# Patient Record
Sex: MALE | Race: WHITE | URBAN - METROPOLITAN AREA
[De-identification: names, ages, dates, MRNs, and addresses within clinical notes are randomized per-mention and may not be internally consistent; named-entity substitution may affect disease eponyms.]

---

## 2017-04-19 ENCOUNTER — THERAPY VISIT (OUTPATIENT)
Dept: PHYSICAL THERAPY | Facility: CLINIC | Age: 57
End: 2017-04-19
Payer: COMMERCIAL

## 2017-04-19 DIAGNOSIS — M25.521 RIGHT ELBOW PAIN: ICD-10-CM

## 2017-04-19 DIAGNOSIS — S52.121D CLOSED DISPLACED FRACTURE OF HEAD OF RIGHT RADIUS WITH ROUTINE HEALING, SUBSEQUENT ENCOUNTER: Primary | ICD-10-CM

## 2017-04-19 PROCEDURE — 97110 THERAPEUTIC EXERCISES: CPT | Mod: GP | Performed by: PHYSICAL THERAPIST

## 2017-04-19 PROCEDURE — 97161 PT EVAL LOW COMPLEX 20 MIN: CPT | Mod: GP | Performed by: PHYSICAL THERAPIST

## 2017-04-19 NOTE — LETTER
[facility] ATHLETIC [facility] PHYSICAL THERAPY  [address]  [facility] [address]  [phone]    2017    Re: Dale Villareal   :   1960  MRN:  [id number]   REFERRING PHYSICIAN:   Chavo Pereyra    [facility] ATHLETIC [facility] PHYSICAL THERAPY    Date of Initial Evaluation:  2017  Visits:   1  Reason for Referral:     Closed displaced fracture of head of right radius with routine healing, subsequent encounter  Right elbow pain    [facility] Initial Evaluation - Upper Extremity   Evaluation Date:  17  Referral: Dr. Pereyra @ [facility]  Employment:  Retired   Employment Status: n/a  Work Mechanical Stresses:  N/a  Leisure Mechanical Stresses: driving, repetitive tasks  Baselines/Functional Disability: difficulty turning key to start car, unable to fully straighten elbow   VAS Score (0-10):  4    HISTORY:   Patient presents with:  Intermittent achy pain (Constant/Intermittent, Dull/Achy/Sharp/Stabbing/Throbbing)  Pain location:  (R) elbow   Radiates to:  none  Paraesthesia:  none  Present Since: 03/15/17  Commenced as a result of: fell and landed on cement, hitting elbow  Status:  New and improving slowly  (new/recurrent/chronic) and (improving/not improving/worsening)   Symptoms at onset: (R) elbow   Constant symptoms:  none  Intermittent symptoms: elbow  Symptoms are made worse with: bending elbow, reaching, pushing (to get up from a chair)  (bending, sitting, turning neck, dressing, reaching, gripping; am, as the day progresses, pm; when still, when on the move; sleeping: prone, supine, side (R), side (L); other)   Symptoms are made better with:  When still   (bending, sitting, turning neck, dressing, reaching, gripping; am, as the day progresses, pm; when still, when on the move; sleeping: prone, supine, side (R), side (L); other)     Re: Dale Villarael   :   1960    HISTORY  "(continued)  Continued use makes the pain:  Worse (better, worse, no effect)  Disturbed night:  no  Pain at rest:  no  Previous Episodes:  no  Previous Treatments:  none  Handedness:  Right     Specific Questions (as reported by the patient):  Do you have pain with coughing or sneezing:  no  Overall General Health:  good  Imaging/Special testing:  X-ray  Recent or major surgery:  no  Do you have night pain:  no  Have you had any recent accidents:  no  Have you experienced any unexplained weight loss:  no  Pertinent medical history includes: depression  Medical allergies includes: no  Current medications:  Anti-depressants   Barriers at home: none  Red Flags:  none    Site(s) for physical examination: (R) elbow/shoulder   OBJECTIVE EXAMINATION:   AROM:   Shoulder / Elbow   (Pain during motion: PDM; End-range Pain:  ERP)  MOVEMENT LOSS Right/Pain Left/Pain   Flexion  Wnl/ERP at proximal humerus    Abduction  Min loss/ERP proximal humerus    External Rotation     Internal Rotation     Extension      Flexion/IR Lacking 1\"/ERP    Extension/ER Lacking 1\"/ERP    ELBOW:      Flexion  142/ERP    Extension Lacking 16/tight    Supination/Pronation Wnl/ -       Re: Dale Villareal   :   1960    PROM:    (Pain during motion: PDM; End-range Pain:  ERP)  MOVEMENT LOSS Right/Pain Left/Pain   Flexion  Wnl/-    Abduction  Wnl/-    External Rotation Wnl/-    Internal Rotation Wnl/-    Extension      ELBOW:     Flexion  140/ERP    Extension Lacking 8/ERP    Resisted Testing:  (Pain during motion: PDM; End-range Pain:  ERP)   Right Left Pain   Shoulder:      Flexion  4+  +   Abduction  4+  +   External Rotation      Internal Rotation      Extension       Elbow:      Flexion  5-  +   Extension  4+  +    strength:  Right:  41lbs  Left:  46lbs  Repeated Tests:  Not assessed   Spine: Spine testing:   Not relevant  Provisional Classification:  Peripheral:   Other: Radial fx  Principle of Management:   Shoulder/Elbow " stretching and strengthening     Assessment/Plan:    Patient is a 56 year old male with right side elbow complaints.    Patient has the following significant findings with corresponding treatment plan.                Diagnosis 1:  Right Proximal Radial Fx  Pain -  manual therapy, self management, education, directional preference exercise and home program  Decreased ROM/flexibility - manual therapy, therapeutic exercise and home program  Decreased strength - therapeutic exercise, therapeutic activities and home program  Decreased function - therapeutic activities and home program          Re: Dale Villareal   :   1960    Therapy Evaluation Codes:   1) History comprised of:   Personal factors that impact the plan of care:      None.    Comorbidity factors that impact the plan of care are:      None.     Medications impacting care: None.  2) Examination of Body Systems comprised of:   Body structures and functions that impact the plan of care:      Elbow.   Activity limitations that impact the plan of care are:      Driving and pushing .  3) Clinical presentation characteristics are:   Stable/Uncomplicated.  4) Decision-Making    Low complexity using standardized patient assessment instrument and/or measureable assessment of functional outcome.  Cumulative Therapy Evaluation is: Low complexity.    Previous and current functional limitations:  (See Goal Flow Sheet for this information)    Short term and Long term goals: (See Goal Flow Sheet for this information)   Communication ability:  Patient appears to be able to clearly communicate and understand verbal and written communication and follow directions correctly.  Treatment Explanation - The following has been discussed with the patient:   RX ordered/plan of care, Anticipated outcomes, Possible risks and side effects    This patient would benefit from PT intervention to resume normal activities.   Rehab potential is good.  Frequency:  1 X week, once  daily  Duration:  for 4 weeks  Discharge Plan:  Achieve all LTG.  Independent in home treatment program.  Reach maximal therapeutic benefit.    Thank you for your referral.    INQUIRIES        Therapist:  Nicki Florez DPT, cert MDT   [facility] PHYSICAL THERAPY  [address] [facility] [address]  [facility] [address]  Phone: [phone]  Fax: [phone]

## 2017-04-19 NOTE — PROGRESS NOTES
[facility] Initial Evaluation - Upper Extremity     Evaluation Date:  04/19/17  Referral: Dr. Pereyra @ [facility]  Employment:  Retired   Employment Status: n/a  Work Mechanical Stresses:  N/a  Leisure Mechanical Stresses: driving, repetitive tasks  Baselines/Functional Disability: difficulty turning key to start car, unable to fully straighten elbow   VAS Score (0-10):  4      HISTORY:   Patient presents with:  Intermittent achy pain  (Constant/Intermittent, Dull/Achy/Sharp/Stabbing/Throbbing)    Pain location:  (R) elbow   Radiates to:  none  Paraesthesia:  none    Present Since: 03/15/17  Commenced as a result of: fell and landed on cement, hitting elbow  Status:  New and improving slowly  (new/recurrent/chronic) and (improving/not improving/worsening)   Symptoms at onset: (R) elbow   Constant symptoms:  none  Intermittent symptoms: elbow    Symptoms are made worse with: bending elbow, reaching, pushing (to get up from a chair)  (bending, sitting, turning neck, dressing, reaching, gripping; am, as the day progresses, pm; when still, when on the move; sleeping: prone, supine, side (R), side (L); other)   Symptoms are made better with:  When still   (bending, sitting, turning neck, dressing, reaching, gripping; am, as the day progresses, pm; when still, when on the move; sleeping: prone, supine, side (R), side (L); other)   Continued use makes the pain:  Worse   (better, worse, no effect)  Disturbed night:  no  Pain at rest:  no  Previous Episodes:  no  Previous Treatments:  none  Handedness:  Right     Specific Questions (as reported by the patient):  Do you have pain with coughing or sneezing:  no  Overall General Health:  good  Imaging/Special testing:  X-ray  Recent or major surgery:  no  Do you have night pain:  no  Have you had any recent accidents:  no  Have you experienced any unexplained weight loss:  no  Pertinent medical history includes: depression  Medical allergies includes:  "no  Current medications:  Anti-depressants   Barriers at home: none  Red Flags:  none      Site(s) for physical examination: (R) elbow/shoulder       OBJECTIVE EXAMINATION:         AROM:   Shoulder / Elbow   (Pain during motion: PDM; End-range Pain:  ERP)  MOVEMENT LOSS Right/Pain Left/Pain   Flexion  Wnl/ERP at proximal humerus    Abduction  Min loss/ERP proximal humerus    External Rotation     Internal Rotation     Extension      Flexion/IR Lacking 1\"/ERP    Extension/ER Lacking 1\"/ERP    ELBOW:      Flexion  142/ERP    Extension Lacking 16/tight    Supination/Pronation Wnl/ -       PROM:    (Pain during motion: PDM; End-range Pain:  ERP)  MOVEMENT LOSS Right/Pain Left/Pain   Flexion  Wnl/-    Abduction  Wnl/-    External Rotation Wnl/-    Internal Rotation Wnl/-    Extension      ELBOW:     Flexion  140/ERP    Extension Lacking 8/ERP      Resisted Testing:  (Pain during motion: PDM; End-range Pain:  ERP)   Right Left Pain   Shoulder:      Flexion  4+  +   Abduction  4+  +   External Rotation      Internal Rotation      Extension       Elbow:      Flexion  5-  +   Extension  4+  +      strength:  Right:  41lbs  Left:  46lbs      Repeated Tests:   Not assessed       Spine:  Spine testing:   Not relevant    Provisional Classification:  Peripheral:   Other: Radial fx    Principle of Management:   Shoulder/Elbow stretching and strengthening       HPI                    System    Physical Exam    General     ROS    Assessment/Plan:      Patient is a 56 year old male with right side elbow complaints.    Patient has the following significant findings with corresponding treatment plan.                Diagnosis 1:  Right Proximal Radial Fx  Pain -  manual therapy, self management, education, directional preference exercise and home program  Decreased ROM/flexibility - manual therapy, therapeutic exercise and home program  Decreased strength - therapeutic exercise, therapeutic activities and home program  Decreased " function - therapeutic activities and home program    Therapy Evaluation Codes:   1) History comprised of:   Personal factors that impact the plan of care:      None.    Comorbidity factors that impact the plan of care are:      None.     Medications impacting care: None.  2) Examination of Body Systems comprised of:   Body structures and functions that impact the plan of care:      Elbow.   Activity limitations that impact the plan of care are:      Driving and pushing .  3) Clinical presentation characteristics are:   Stable/Uncomplicated.  4) Decision-Making    Low complexity using standardized patient assessment instrument and/or measureable assessment of functional outcome.  Cumulative Therapy Evaluation is: Low complexity.    Previous and current functional limitations:  (See Goal Flow Sheet for this information)    Short term and Long term goals: (See Goal Flow Sheet for this information)     Communication ability:  Patient appears to be able to clearly communicate and understand verbal and written communication and follow directions correctly.  Treatment Explanation - The following has been discussed with the patient:   RX ordered/plan of care  Anticipated outcomes  Possible risks and side effects  This patient would benefit from PT intervention to resume normal activities.   Rehab potential is good.    Frequency:  1 X week, once daily  Duration:  for 4 weeks  Discharge Plan:  Achieve all LTG.  Independent in home treatment program.  Reach maximal therapeutic benefit.    Please refer to the daily flowsheet for treatment today, total treatment time and time spent performing 1:1 timed codes.

## 2017-07-26 PROBLEM — M25.521 RIGHT ELBOW PAIN: Status: RESOLVED | Noted: 2017-04-19 | Resolved: 2017-07-26

## 2017-07-26 PROBLEM — S52.121D CLOSED DISPLACED FRACTURE OF HEAD OF RIGHT RADIUS WITH ROUTINE HEALING, SUBSEQUENT ENCOUNTER: Status: RESOLVED | Noted: 2017-04-19 | Resolved: 2017-07-26
